# Patient Record
Sex: FEMALE | Race: WHITE
[De-identification: names, ages, dates, MRNs, and addresses within clinical notes are randomized per-mention and may not be internally consistent; named-entity substitution may affect disease eponyms.]

---

## 2018-10-12 ENCOUNTER — HOSPITAL ENCOUNTER (OUTPATIENT)
Dept: HOSPITAL 38 - CC.SDS | Age: 63
End: 2018-10-12
Attending: FAMILY MEDICINE
Payer: MEDICARE

## 2018-10-12 VITALS — SYSTOLIC BLOOD PRESSURE: 107 MMHG | DIASTOLIC BLOOD PRESSURE: 57 MMHG

## 2018-10-12 DIAGNOSIS — Z12.11: Primary | ICD-10-CM

## 2018-10-12 DIAGNOSIS — E78.00: ICD-10-CM

## 2018-10-12 DIAGNOSIS — Z79.4: ICD-10-CM

## 2018-10-12 DIAGNOSIS — Z79.899: ICD-10-CM

## 2018-10-12 DIAGNOSIS — Z79.82: ICD-10-CM

## 2018-10-12 DIAGNOSIS — Z87.891: ICD-10-CM

## 2018-10-12 DIAGNOSIS — Z88.8: ICD-10-CM

## 2018-10-12 DIAGNOSIS — E10.9: ICD-10-CM

## 2018-10-12 NOTE — OR
DATE OF OPERATION:  10/12/2018

 

PREOPERATIVE DIAGNOSIS:  SCREENING COLONOSCOPY.

 

POSTOPERATIVE DIAGNOSIS:  SCREENING COLONOSCOPY.

 

SURGEON:  Waldo Baca MD

 

PROCEDURE:  COLONOSCOPY.

 

ANESTHESIA:  CRNA.

 

COMPLICATIONS:  None.

 

SPECIMEN:  None.

 

FINDINGS:

1. Full length colonoscopy.

2. Poor prep.

 

RECOMMENDATIONS:  Follow up routine colonoscopy every 10 years.

 

INDICATIONS:  The patient was in for welcome to Medicare Physical.  She is due

for a screening colonoscopy.

 

DESCRIPTION OF PROCEDURE:  The patient was prepped and draped, placed in the

left lateral decubitus position.  A lubricated Olympus colonoscope was inserted

and easily advanced to the cecum.  Direct visualization of the ileocecal valve

and appendiceal orifice was accomplished.  Bowel prep was marginal and there was

a lot of stool, we actually suctioned out couple liters worth of stool, and some

areas were just impossible to visualize, most notably the rectal vault and the

cecum.  Scattered areas throughout were also difficult to see, so flat and

smaller lesions would definitely have potentially missed.  Throughout the length

of the colon, I could not find any obvious signs of polyps, mass, ulceration, or

bleeding sites.  No vascular abnormalities or signs of colitis.  No significant

diverticula.  The rectal vault was hard to see, was very shallow and I could not

retroflex and get a view of the perianal region in its entirety.  Upon

withdrawal, I

could not see any obvious lesions.  However, air was suctioned as best as

possible.  Scope removed without complication.

 

ALLISON/LUIS ANTONIO

DD:  10/12/2018 09:08:20

DT:  10/12/2018 11:12:47

Job #:  [id number]

## 2019-12-01 NOTE — EDM.PDOC
ED HPI GENERAL MEDICAL PROBLEM





- General


Chief Complaint: General


Stated Complaint: fall


Time Seen by Provider: 12/01/19 01:14


Source of Information: Reports: Patient, EMS, Family


History Limitations: Reports: No Limitations





- History of Present Illness


INITIAL COMMENTS - FREE TEXT/NARRATIVE: 





Patient to the emergency department by EMS where she was at home and she 

slipped and fell in her kitchen the patient advised that she just lost her 

balance she did not hit her head and there was no loss of consciousness the 

patient denies any upper mid or lower back pain she denies any hip or pelvis 

pain she denies any upper or lower extremity pain she only complains of some 

mild pain to the left side of her mid neck area.  There is no numbness or 

tingling.  The patient denies any abdominal pain there is no nausea no vomiting 

she also denies any problems ambulating, the patient's GCS is 4-5-6.  This is 

not a trauma code.


Onset: Today


Duration: Minutes:


Location: Reports: Neck


Quality: Reports: Ache


Improves with: Reports: None


Worsens with: Reports: Movement


Context: Reports: Trauma


Associated Symptoms: Reports: No Other Symptoms.  Denies: Confusion, Chest Pain

, Headaches, Nausea/Vomiting, Shortness of Breath, Syncope, Weakness


Treatments PTA: Reports: Spinal Immobilization


  ** Left Posterior Neck


Pain Score (Numeric/FACES): 3





- Related Data


 Allergies











Allergy/AdvReac Type Severity Reaction Status Date / Time


 


diphenhydramine Allergy  Itching Verified 12/01/19 01:20





[From Benadryl]     











Home Meds: 


 Home Meds





Insulin Glarg,Human.Rec.Analog [Lantus] 15 units SQ QAM 04/12/17 [History]


Insulin Lispro Prot/Lispro [HumaLOG Mix 75-25] 2 - 4 unit SQ TIDMEALS 04/12/17 [

History]


atorvaSTATin [Lipitor] 40 mg PO BEDTIME 04/12/17 [History]


Aspirin [Halfprin] 81 mg PO BEDTIME 10/10/18 [History]


Losartan [Cozaar] 50 mg PO BEDTIME 10/10/18 [History]


Calcium Carbonate/Vitamin D3 [Calcium 1,000 + D3 Caplet] 1 tab PO BID 11/08/18 [

History]


Denosumab [Prolia] 60 mg SQ Q6M 05/09/19 [History]


Methocarbamol [Robaxin] 500 mg PO TID 10 Days #30 tab 12/01/19 [Rx]











Past Medical History


Cardiovascular History: Reports: High Cholesterol, Hypertension


Endocrine/Metabolic History: Reports: Diabetes, Type II





- Past Surgical History


Musculoskeletal Surgical History: Reports: Hip Replacement, Shoulder Surgery, 

Other (See Below)





ED ROS GENERAL





- Review of Systems


Review Of Systems: See Below


Constitutional: Reports: No Symptoms


HEENT: Reports: No Symptoms.  Denies: Ear Pain, Nose Pain, Throat Pain, Vision 

Change


Respiratory: Reports: No Symptoms.  Denies: Shortness of Breath


Cardiovascular: Reports: No Symptoms.  Denies: Chest Pain


Endocrine: Reports: No Symptoms


GI/Abdominal: Reports: No Symptoms.  Denies: Abdominal Pain, Nausea, Vomiting


: Reports: No Symptoms


Musculoskeletal: Reports: Neck Pain.  Denies: Shoulder Pain, Arm Pain, Back Pain

, Leg Pain


Skin: Reports: No Symptoms.  Denies: Bruising, Rash, Erythema


Neurological: Reports: No Symptoms.  Denies: Confusion, Dizziness, Headache, 

Numbness, Syncope, Tingling, Trouble Speaking, Difficulty Walking, Weakness, 

Change in Speech, Gait Disturbance


Psychiatric: Reports: No Symptoms





ED EXAM, GENERAL





- Physical Exam


Exam: See Below


Exam Limited By: No Limitations


General Appearance: Alert, WD/WN, No Apparent Distress


Eye Exam: Bilateral Eye: EOMI


Ears: Normal External Exam, Normal Canal, Hearing Grossly Normal, Normal TMs


Ear Exam: Bilateral Ear: Auricle Normal, Canal Normal, TM normal


Nose: Normal Inspection, Normal Mucosa


Throat/Mouth: Normal Inspection, Normal Lips, Normal Oropharynx, Normal Voice, 

No Airway Compromise


Head: Atraumatic, Normocephalic


Neck: Normal Inspection, Supple, Full Range of Motion, Tender Lateral (Left side

)


Respiratory/Chest: No Respiratory Distress, Lungs Clear, Normal Breath Sounds, 

Chest Non-Tender


Cardiovascular: Normal Peripheral Pulses, Regular Rate, Rhythm, No Murmur


Peripheral Pulses: 2+: Radial (L), Radial (R)


GI/Abdominal: Soft, Non-Tender


Back Exam: Normal Inspection, Full Range of Motion.  No: Paraspinal Tenderness, 

Vertebral Tenderness


Extremities: Normal Inspection, Normal Range of Motion, Non-Tender, Normal 

Capillary Refill


Neurological: Alert, Oriented, Normal Cognition, Normal Gait, No Motor/Sensory 

Deficits


Psychiatric: Normal Affect, Normal Mood


Skin Exam: Warm, Dry, Intact, Normal Color





Course





- Vital Signs


Text/Narrative:: 





The patient was evaluated in the emergency department x-rays of cervical spine 

was obtained and a preliminary reading by this writer shows significant multi-

level degenerative changes.  However, there is no prevertebral soft tissue 

swelling and no definite cortical interruptions.  The radiology reading is 

still pending.  The patient will be discharged home she will be given Robaxin 

500 mg 3 times daily for 10 days, the patient will also be given a take-home 

dose of ketorolac 10 mg 1 tablet 3 times daily as needed.  The patient will be 

advised to follow-up family doctor this week and return emergency department 

sooner if worsening problems


Last Recorded V/S: 


 Last Vital Signs











Temp  35.6 C   12/01/19 01:14


 


Pulse  80   12/01/19 01:14


 


Resp  16   12/01/19 01:14


 


BP  166/91 H  12/01/19 01:14


 


Pulse Ox  99   12/01/19 01:14














- Orders/Labs/Meds


Orders: 


 Active Orders 24 hr











 Category Date Time Status


 


 Cervical Spine Min 4V [CR] Stat Exams  12/01/19 01:20 Ordered











Meds: 


Medications














Discontinued Medications














Generic Name Dose Route Start Last Admin





  Trade Name Freq  PRN Reason Stop Dose Admin


 


Cyclobenzaprine HCl  1 packet  12/01/19 01:53  





  Take Home: Cyclobenzaprine 10 Mg, 4 Tab Pack  PO  12/01/19 01:54  





  ONETIME ONE   





     





     





     





     


 


Ketorolac Tromethamine  1 packet  12/01/19 01:53  





  Take Home: Ketorolac 10 Mg, 4 Tab Pack  PO  12/01/19 01:54  





  ONETIME ONE   





     





     





     





     














Departure





- Departure


Time of Disposition: 01:48


Disposition: Home, Self-Care 01


Condition: Good


Clinical Impression: 


 Fall, Repetitive strain injury of cervical spine








- Discharge Information


*PRESCRIPTION DRUG MONITORING PROGRAM REVIEWED*: Not Applicable


*COPY OF PRESCRIPTION DRUG MONITORING REPORT IN PATIENT DANIELA: Not Applicable


Prescriptions: 


Methocarbamol [Robaxin] 500 mg PO TID 10 Days #30 tab


Instructions:  Cervical Sprain, Easy-to-Read


Forms:  ED Department Discharge


Additional Instructions: 


Rest


Increase fluid


Robaxin 500 mg 3 times a day for 10 days


Alternate Tylenol Motrin as needed for pain


Follow-up family doctor this week, call Monday for an appointment time


Return to emergency department sooner if worsening problems





- Problem List & Annotations


(1) Fall


SNOMED Code(s): 8043857, 926530025


   Code(s): W19.XXXA - UNSPECIFIED FALL, INITIAL ENCOUNTER   Status: Acute   

Priority: Medium   


Qualifiers: 


   Encounter type: initial encounter   Qualified Code(s): W19.XXXA - 

Unspecified fall, initial encounter   





(2) Repetitive strain injury of cervical spine


SNOMED Code(s): 865934543, 461072607


   Code(s): S16.1XXA - STRAIN OF MUSCLE, FASCIA AND TENDON AT NECK LEVEL, INIT 

  Status: Acute   Priority: Medium   


Qualifiers: 


   Encounter type: initial encounter   Qualified Code(s): S16.1XXA - Strain of 

muscle, fascia and tendon at neck level, initial encounter   





- Problem List Review


Problem List Initiated/Reviewed/Updated: Yes





- My Orders


Last 24 Hours: 


My Active Orders





12/01/19 01:20


Cervical Spine Min 4V [CR] Stat 














- Assessment/Plan


Last 24 Hours: 


My Active Orders





12/01/19 01:20


Cervical Spine Min 4V [CR] Stat 











Plan: 





As above, the patient's GCS is 4-5-6.  The patient's family is here to drive 

her home.

## 2020-11-11 NOTE — EDM.PDOC
ED HPI GENERAL MEDICAL PROBLEM





- General


Chief Complaint: Chest Pain


Stated Complaint: Left arm pain


Time Seen by Provider: 11/11/20 14:59


Source of Information: Reports: Patient


History Limitations: Reports: No Limitations





- History of Present Illness


INITIAL COMMENTS - FREE TEXT/NARRATIVE: 





Catherine is a 65 year old female who presents to ER with complaints of left side 

pain that radiates around her back.  She states the pain started last evening 

but is now pain free.  Was concerned about being possible heart related.  She 

denies any shortness of breath.  No nausea/diaphoresis.  States the pain was 

sharp in the left side and along the scapular bone.  Denies any rash.  No 

fevers.  History of diabetes/smoker.  


Onset: Gradual


Duration: Hour(s):, Improving


Location: Reports: Chest


Quality: Reports: Ache


Severity: Moderate


Improves with: Reports: None


Associated Symptoms: Reports: Chest Pain.  Denies: Confusion, Cough, 

Fever/Chills, Headaches, Loss of Appetite, Malaise, Nausea/Vomiting, Shortness 

of Breath, Syncope, Weakness





- Related Data


                                    Allergies











Allergy/AdvReac Type Severity Reaction Status Date / Time


 


diphenhydramine Allergy  Itching Verified 11/11/20 14:44





[From Benadryl]     











Home Meds: 


                                    Home Meds





Insulin Glarg,Human.Rec.Analog [Lantus] 15 units SQ QAM 04/12/17 [History]


Insulin Lispro Prot/Lispro [HumaLOG Mix 75-25] 2 - 4 unit SQ TIDMEALS 04/12/17 

[History]


atorvaSTATin [Lipitor] 40 mg PO BEDTIME 04/12/17 [History]


Aspirin [Halfprin] 81 mg PO BEDTIME 10/10/18 [History]


Losartan [Cozaar] 50 mg PO BEDTIME 10/10/18 [History]


Calcium Carbonate/Vitamin D3 [Calcium 1,000 + D3 Caplet] 1 tab PO BID 11/08/18 

[History]


Denosumab [Prolia] 60 mg SQ Q6M 05/09/19 [History]











Past Medical History


Cardiovascular History: Reports: High Cholesterol, Hypertension


Endocrine/Metabolic History: Reports: Diabetes, Type II





- Past Surgical History


Neurological Surgical History: Reports: Lumbar Spine


Musculoskeletal Surgical History: Reports: Hip Replacement, Shoulder Surgery, 

Other (See Below)





Social & Family History





- Tobacco Use


Tobacco Use Status *Q: Current Every Day Tobacco User


Years of Tobacco use: 2


Packs/Tins Daily: 0.5





- Caffeine Use


Caffeine Use: Reports: Coffee





- Alcohol Use


Days Per Week of Alcohol Use: 4


Number of Drinks Per Day: 3


Total Drinks Per Week: 12





- Recreational Drug Use


Recreational Drug Use: No





ED ROS GENERAL





- Review of Systems


Review Of Systems: See Below


Constitutional: Denies: Fever, Chills, Malaise, Weakness, Fatigue, Decreased 

Appetite


HEENT: Denies: Ear Pain, Rhinitis, Sinus Problem, Throat Pain


Respiratory: Denies: Shortness of Breath, Cough


Cardiovascular: Denies: Chest Pain, Lightheadedness


Endocrine: Denies: Fatigue


GI/Abdominal: Denies: Abdominal Pain, Nausea, Vomiting


: Reports: No Symptoms


Musculoskeletal: Reports: Shoulder Pain


Skin: Reports: No Symptoms


Neurological: Reports: No Symptoms


Psychiatric: Reports: No Symptoms





ED EXAM, GENERAL





- Physical Exam


Exam: See Below


Exam Limited By: No Limitations


General Appearance: Alert, WD/WN, No Apparent Distress


Ears: Normal External Exam, Normal TMs


Nose: Normal Inspection, Normal Mucosa, No Blood


Throat/Mouth: Normal Inspection, Normal Oropharynx


Head: Normocephalic


Neck: Normal Inspection, Supple, Non-Tender


Respiratory/Chest: No Respiratory Distress, Lungs Clear, Normal Breath Sounds


Cardiovascular: Regular Rate, Rhythm


GI/Abdominal: Normal Bowel Sounds, Soft, Non-Tender


Extremities: Normal Inspection, No Pedal Edema


Neurological: Alert, Oriented


Skin Exam: Warm, Dry





Course





- Vital Signs


Last Recorded V/S: 


                                Last Vital Signs











Temp  97.7 F   11/11/20 14:27


 


Pulse  71   11/11/20 15:05


 


Resp  15   11/11/20 15:05


 


BP  118/60   11/11/20 15:05


 


Pulse Ox  96   11/11/20 15:05














- Orders/Labs/Meds


Orders: 


                               Active Orders 24 hr











 Category Date Time Status


 


 Chest 2V [CR] Stat Exams  11/11/20 14:34 Taken











Labs: 


                                Laboratory Tests











  11/11/20 11/11/20 11/11/20 Range/Units





  14:25 14:25 14:25 


 


WBC  5.5    (5.0-10.0)  10^3/uL


 


RBC  3.98 L    (4.00-5.50)  10^6/uL


 


Hgb  13.3    (12.0-16.0)  g/dL


 


Hct  39.4    (37.0-47.0)  %


 


MCV  99.0 H    (82.0-94.0)  fL


 


MCH  33.4 H    (27.0-32.0)  pg


 


MCHC  33.8    (33.0-38.0)  g/dL


 


RDW Coeff of [place]  11.8    (11.0-15.0)  %


 


Plt Count  262    (150-400)  10^3/uL


 


Neut % (Auto)  62.8    (35-85)  %


 


Lymph % (Auto)  24.9    (10-55)  %


 


Mono % (Auto)  10.3    (0-16)  %


 


Eos % (Auto)  1.6    (0-5)  %


 


Baso % (Auto)  0.4    (0-3)  %


 


Neut # (Auto)  3.48    (1.80-7.00)  10^3/uL


 


Lymph # (Auto)  1.38    (1.00-4.80)  10^3/uL


 


Mono # (Auto)  0.57    (0.00-0.80)  10^3/uL


 


Eos # (Auto)  0.09    (0.00-0.45)  10^3/uL


 


Baso # (Auto)  0.02    10^3/uL


 


PT   10.9   (9.7-12.3)  SEC


 


INR   1.08   (0.92-1.18)  


 


Sodium    134 L  (136-145)  mEq/L


 


Potassium    4.2  (3.5-5.0)  mEq/L


 


Chloride    98  ()  mEq/L


 


Carbon Dioxide    30  (21-32)  mmol/L


 


BUN    9  (7-18)  mg/dL


 


Creatinine    0.6  (0.6-1.0)  mg/dL


 


Est Cr Clr Drug Dosing    88.48  mL/min


 


Estimated GFR (MDRD)    > 60  (>=60)  mL/min


 


Glucose    209 H  (75-99)  mg/dL


 


Calcium    8.7  (8.4-10.1)  mg/dL


 


Total Bilirubin    0.5  (0.0-1.0)  mg/dL


 


AST    20  (15-37)  U/L


 


ALT    28  (12-78)  U/L


 


Alkaline Phosphatase    83  ()  U/L


 


Lactate Dehydrogenase    174  (100-190)  U/L


 


Creatine Kinase    44  ()  U/L


 


Troponin I    < 0.017  (0.00-0.06)  ng/mL


 


Total Protein    7.1  (6.4-8.2)  g/dL


 


Albumin    3.9  (3.4-5.0)  g/dL


 


Lipase    93  ()  U/L














- Re-Assessments/Exams


Free Text/Narrative Re-Assessment/Exam: 





11/11/20 


Lab results are all negative.  EKG shows NSR.  discussed possible source of pain

 being musculoskeletal, possibly start of Shingles or if cardiac in nature, may 

need further exam or work up.  





Departure





- Departure


Time of Disposition: 15:18


Disposition: Home, Self-Care 01


Condition: Good


Clinical Impression: 


 Atypical chest pain





Instructions:  Chest Wall Pain, Easy-to-Read


Referrals: 


PCP,Unobtain [Primary Care Provider] - 


Forms:  ED Department Discharge


Additional Instructions: 


1.  Rest


2.  Tylenol or ibuprofen for any discomfort


3.  If pain returns or is associated with any other symptoms, ie. shortness of 

breath, nausea, diaphoresis, rash, please call or present back for reevaluation








Sepsis Event Note (ED)





- Evaluation


Sepsis Screening Result: No Definite Risk





- Focused Exam


Vital Signs: 


                                   Vital Signs











  Temp Pulse Resp BP Pulse Ox


 


 11/11/20 15:05   71  15  118/60  96


 


 11/11/20 14:27  97.7 F  78  20  160/76 H  96














- My Orders


Last 24 Hours: 


My Active Orders





11/11/20 14:34


Chest 2V [CR] Stat 














- Assessment/Plan


Last 24 Hours: 


My Active Orders





11/11/20 14:34


Chest 2V [CR] Stat